# Patient Record
(demographics unavailable — no encounter records)

---

## 2018-03-29 NOTE — UC
Uriel ANGELA Gabriel, scribed for Sue Bobo MD on 18 at 1452 .





FLU HPI





- HPI Summary


HPI Summary: 





This patient is a 30 year old F [place] [facility] [place] [facility] [place] 

with a chief complaint of flu like symptoms that began yesterday around 1900. 

Pt is 9 weeks pregnant and called her OB who suggested she come in for a flu 

test. The patient rates the pain 4/10 in severity. Symptoms alleviated by 

Tylenol. Patient reports fever of 101.8, myalgia, chills, and diarrhea. Patient 

denies cough, nasal discharge, dysuria, urinary frequency, ear pain, and sore 

throat. . Her younger child is currently ill.





- History of Current Complaint


Chief Complaint: UCGeneralIllness


Stated Complaint: FEVER, HEADACHE


Time Seen by Provider: 18 14:46


Hx Obtained From: Patient


Hx Last Menstrual Period: 18


Onset/Duration: Lasting Days - 1, Still Present


Severity Currently: Moderate


Severity Initially: Moderate


Pain Intensity: 4


Pain Scale Used: 0-10 Numeric


Associated Signs & Symptoms: Positive: Fever, F/C, Myalgia, Diarrhea.  Negative

: Sore Throat





- Allergy/Home Medications


Allergies/Adverse Reactions: 


 Allergies











Allergy/AdvReac Type Severity Reaction Status Date / Time


 


No Known Allergies Allergy   Verified 18 14:08











Home Medications: 


 Home Medications





Acetaminophen [Acetaminophen Extra Strength] 1,000 mg PO ONCE 18 [History 

Confirmed 18]


Magnesium Glycinate [Mag Glycinate] 200 mg PO DAILY 18 [History Confirmed 

18]











PMH/Surg Hx/FS Hx/Imm Hx


Endocrine History: Thyroid Disease - hashimoto's


GI/ History: Other


Other GI/ History: IBS





- Surgical History


Surgical History: Yes


Surgery Procedure, Year, and Place: CHOLECYSTECTOMY .  LEFT ULNAR NERVE 

TRANSPOSITION.   





- Family History


Family History: FHx of graves disease





- Social History


Occupation: Employed Full-time - [employer]


Lives: With Family


Alcohol Use: None


Substance Use Type: None


Smoking Status (MU): Never Smoked Tobacco





Review of Systems


Constitutional: Fever, Chills


Gastrointestinal: Diarrhea - history of IBS, tendency to diarrhea whenever she 

feels ill. Onset today, has had 3 loose stools this afternoon.


Musculoskeletal: Myalgia


Is Patient Immunocompromised?: No


All Other Systems Reviewed And Are Negative: Yes





Physical Exam


Triage Information Reviewed: Yes


Appearance: Ill-Appearing - looks mildly unwell, well hydrated.


Vital Signs: 


 Initial Vital Signs











Temp  98.9 F   18 14:10


 


Pulse  103   18 14:10


 


Resp  20   18 14:10


 


BP  100/58   18 14:10


 


Pulse Ox  99   18 14:10











Eyes: Positive: Conjunctiva Clear


ENT: Positive: Pharynx normal


Neck: Positive: Supple, Nontender, No Lymphadenopathy


Respiratory: Positive: Lungs clear, Normal breath sounds


Cardiovascular: Positive: RRR, No Murmur


Abdomen Description: Positive: Nontender, No Organomegaly, Soft


Musculoskeletal Exam: Normal


Neurological: Positive: Alert, Muscle Tone Normal


Psychological Exam: Normal


Skin Exam: Normal





Flu Course/Dx





- Course


Course Of Treatment: Pt medications reviewed this visit.





- Differential Dx/Diagnosis


Differential Diagnosis/HQI/PQRI: Influenza, Upper Respiratory Infection


Provider Diagnoses: viral illness, flu negative.





Discharge





- Sign-Out/Discharge


Documenting (check all that apply): Discharge





- Discharge Plan


Condition: Stable


Disposition: HOME


Patient Education Materials:  Viral Syndrome (ED)


Referrals: 


Naomi Palacio MD [Primary Care Provider] - 


Additional Instructions: 


Influenza testing is negative. 


Continue to control fever with use of acretaminophen and ensure high intake of 

fluids. 


Follow up if you have increasing symptoms such as cough or breathlessness, or 

fever persists beyond 4 days. 


Your symptoms are consistent with a viral illness which will likely be self-

limiting. 








- Billing Disposition and Condition


Condition: STABLE


Disposition: HOME





The documentation as recorded by the Uriel santiago Gabriel accurately reflects 

the service I personally performed and the decisions made by me, Sue Bobo MD.